# Patient Record
Sex: MALE | Race: BLACK OR AFRICAN AMERICAN | Employment: FULL TIME | URBAN - METROPOLITAN AREA
[De-identification: names, ages, dates, MRNs, and addresses within clinical notes are randomized per-mention and may not be internally consistent; named-entity substitution may affect disease eponyms.]

---

## 2019-02-26 ENCOUNTER — HOSPITAL ENCOUNTER (OUTPATIENT)
Dept: PHYSICAL THERAPY | Age: 44
Setting detail: THERAPIES SERIES
Discharge: HOME | End: 2019-02-26
Payer: COMMERCIAL

## 2019-02-26 PROCEDURE — 97110 THERAPEUTIC EXERCISES: CPT

## 2019-02-26 PROCEDURE — 97162 PT EVAL MOD COMPLEX 30 MIN: CPT

## 2019-02-26 ASSESSMENT — PAIN - FUNCTIONAL ASSESSMENT: PAIN_FUNCTIONAL_ASSESSMENT: PREVENTS OR INTERFERES SOME ACTIVE ACTIVITIES AND ADLS

## 2019-02-26 ASSESSMENT — PAIN DESCRIPTION - LOCATION: LOCATION: LEG

## 2019-02-26 ASSESSMENT — PAIN DESCRIPTION - FREQUENCY: FREQUENCY: INTERMITTENT

## 2019-02-26 ASSESSMENT — PAIN SCALES - GENERAL: PAINLEVEL_OUTOF10: 4

## 2019-02-26 ASSESSMENT — PAIN DESCRIPTION - DESCRIPTORS: DESCRIPTORS: DULL;ACHING

## 2019-02-26 ASSESSMENT — PAIN DESCRIPTION - PAIN TYPE: TYPE: SURGICAL PAIN

## 2019-02-26 ASSESSMENT — PAIN DESCRIPTION - PROGRESSION: CLINICAL_PROGRESSION: GRADUALLY IMPROVING

## 2019-02-26 ASSESSMENT — PAIN DESCRIPTION - ONSET: ONSET: SUDDEN

## 2019-02-26 ASSESSMENT — PAIN DESCRIPTION - ORIENTATION: ORIENTATION: RIGHT

## 2019-03-01 ENCOUNTER — HOSPITAL ENCOUNTER (OUTPATIENT)
Dept: PHYSICAL THERAPY | Age: 44
Setting detail: THERAPIES SERIES
Discharge: HOME | End: 2019-03-01
Payer: COMMERCIAL

## 2019-03-01 PROCEDURE — 97110 THERAPEUTIC EXERCISES: CPT

## 2019-03-04 ENCOUNTER — HOSPITAL ENCOUNTER (OUTPATIENT)
Dept: PHYSICAL THERAPY | Age: 44
Setting detail: THERAPIES SERIES
Discharge: HOME | End: 2019-03-04
Payer: COMMERCIAL

## 2019-03-04 PROCEDURE — 97110 THERAPEUTIC EXERCISES: CPT

## 2019-03-06 ENCOUNTER — HOSPITAL ENCOUNTER (OUTPATIENT)
Dept: PHYSICAL THERAPY | Age: 44
Setting detail: THERAPIES SERIES
Discharge: HOME | End: 2019-03-06
Payer: COMMERCIAL

## 2019-03-06 PROCEDURE — 97110 THERAPEUTIC EXERCISES: CPT

## 2019-03-08 ENCOUNTER — HOSPITAL ENCOUNTER (OUTPATIENT)
Dept: PHYSICAL THERAPY | Age: 44
Setting detail: THERAPIES SERIES
Discharge: HOME | End: 2019-03-08
Payer: COMMERCIAL

## 2019-03-08 PROCEDURE — 97110 THERAPEUTIC EXERCISES: CPT

## 2019-03-11 ENCOUNTER — HOSPITAL ENCOUNTER (OUTPATIENT)
Dept: PHYSICAL THERAPY | Age: 44
Setting detail: THERAPIES SERIES
Discharge: HOME | End: 2019-03-11
Payer: COMMERCIAL

## 2019-03-11 PROCEDURE — 97530 THERAPEUTIC ACTIVITIES: CPT

## 2019-03-11 PROCEDURE — 97140 MANUAL THERAPY 1/> REGIONS: CPT

## 2019-03-11 PROCEDURE — 97110 THERAPEUTIC EXERCISES: CPT

## 2019-03-13 ENCOUNTER — HOSPITAL ENCOUNTER (OUTPATIENT)
Dept: PHYSICAL THERAPY | Age: 44
Setting detail: THERAPIES SERIES
Discharge: HOME | End: 2019-03-13
Payer: COMMERCIAL

## 2019-03-13 PROCEDURE — 97140 MANUAL THERAPY 1/> REGIONS: CPT

## 2019-03-13 PROCEDURE — 97110 THERAPEUTIC EXERCISES: CPT

## 2019-03-15 ENCOUNTER — HOSPITAL ENCOUNTER (OUTPATIENT)
Dept: PHYSICAL THERAPY | Age: 44
Setting detail: THERAPIES SERIES
Discharge: HOME | End: 2019-03-15
Payer: COMMERCIAL

## 2019-03-15 PROCEDURE — 97110 THERAPEUTIC EXERCISES: CPT

## 2019-03-15 PROCEDURE — 97140 MANUAL THERAPY 1/> REGIONS: CPT

## 2019-03-18 ENCOUNTER — HOSPITAL ENCOUNTER (OUTPATIENT)
Dept: PHYSICAL THERAPY | Age: 44
Setting detail: THERAPIES SERIES
Discharge: HOME | End: 2019-03-18
Payer: COMMERCIAL

## 2019-03-18 PROCEDURE — 97116 GAIT TRAINING THERAPY: CPT

## 2019-03-18 PROCEDURE — 97140 MANUAL THERAPY 1/> REGIONS: CPT

## 2019-03-18 PROCEDURE — 97110 THERAPEUTIC EXERCISES: CPT

## 2019-03-20 ENCOUNTER — HOSPITAL ENCOUNTER (OUTPATIENT)
Dept: PHYSICAL THERAPY | Age: 44
Setting detail: THERAPIES SERIES
Discharge: HOME | End: 2019-03-20
Payer: COMMERCIAL

## 2019-03-20 PROCEDURE — 97110 THERAPEUTIC EXERCISES: CPT

## 2019-03-20 PROCEDURE — 97140 MANUAL THERAPY 1/> REGIONS: CPT

## 2019-03-22 ENCOUNTER — HOSPITAL ENCOUNTER (OUTPATIENT)
Dept: PHYSICAL THERAPY | Age: 44
Setting detail: THERAPIES SERIES
Discharge: HOME | End: 2019-03-22
Payer: COMMERCIAL

## 2019-03-22 PROCEDURE — 97110 THERAPEUTIC EXERCISES: CPT

## 2019-03-22 PROCEDURE — 97140 MANUAL THERAPY 1/> REGIONS: CPT

## 2019-03-25 ENCOUNTER — HOSPITAL ENCOUNTER (OUTPATIENT)
Dept: PHYSICAL THERAPY | Age: 44
Setting detail: THERAPIES SERIES
Discharge: HOME | End: 2019-03-25
Payer: COMMERCIAL

## 2019-03-25 PROCEDURE — 97110 THERAPEUTIC EXERCISES: CPT

## 2019-03-25 PROCEDURE — 97530 THERAPEUTIC ACTIVITIES: CPT

## 2019-03-27 ENCOUNTER — HOSPITAL ENCOUNTER (OUTPATIENT)
Dept: PHYSICAL THERAPY | Age: 44
Setting detail: THERAPIES SERIES
Discharge: HOME | End: 2019-03-27
Payer: COMMERCIAL

## 2019-03-27 PROCEDURE — G0283 ELEC STIM OTHER THAN WOUND: HCPCS

## 2019-03-27 PROCEDURE — 97530 THERAPEUTIC ACTIVITIES: CPT

## 2019-03-27 PROCEDURE — 97110 THERAPEUTIC EXERCISES: CPT

## 2019-03-29 ENCOUNTER — HOSPITAL ENCOUNTER (OUTPATIENT)
Dept: PHYSICAL THERAPY | Age: 44
Setting detail: THERAPIES SERIES
Discharge: HOME | End: 2019-03-29
Payer: COMMERCIAL

## 2019-03-29 PROCEDURE — 97110 THERAPEUTIC EXERCISES: CPT

## 2019-03-29 PROCEDURE — 97140 MANUAL THERAPY 1/> REGIONS: CPT

## 2019-03-29 PROCEDURE — G0283 ELEC STIM OTHER THAN WOUND: HCPCS

## 2019-04-03 ENCOUNTER — HOSPITAL ENCOUNTER (OUTPATIENT)
Dept: PHYSICAL THERAPY | Age: 44
Setting detail: THERAPIES SERIES
Discharge: HOME | End: 2019-04-03
Payer: COMMERCIAL

## 2019-04-03 PROCEDURE — 97110 THERAPEUTIC EXERCISES: CPT

## 2019-04-03 PROCEDURE — G0283 ELEC STIM OTHER THAN WOUND: HCPCS

## 2019-04-03 NOTE — FLOWSHEET NOTE
flexion. 3/13/19: No pain. I walked a lot lately. 3/15/19: Pt reports no pain right now . Took meds about one hour ago. Sleeping in bed now, not in recliner. 3/18/19: Pt reports no pain and states that he is tired from overdoing it this weekend. 03/20/19: Patient reports he has been using his cane more. 03/22/19: Patient reports he has minimal pain,just stiffness. 3/25/19: States he is really sore in his R thigh, may be due to the rain. 3/27/19: States he saw the MD and he is to come 4 more weeks. Pt is doing prettyg good  3/29/19: Pt reports that he is tight in the R thigh, but has no pain. 04/03/19: Patient reports leg was more swollen after he did a lot of walking over the weekend. Objective: See eval  Observation:   Test measurements:  AROM R knee flex 84  03/06/19 knee flex with OP 90  3/11/19: right knee ROM : 4-95 with OP  3/29/19: right knee ROM =96/97      Exercises:  Exercise/Equipment Resistance/Repetitions Other comments   EZ Stretch 3 min, 3 min, 3 min    TKE 30 X 3#    Heel slides 10\"x 10     20 X    Nu step  L6 x 6 min To work on knee flexion   Standing marching 30 x   alternating Added 3/01   Standing abd 20 x alternating Added 3/01   Minisquats 20 x Added 3/01   Step flex strecth 3 x 30 sec  Added 3/04    HR  30 x  Added 3/04   Seated curl     Leg press 95# x 20x 2 Added 3/8/19   Sidestepping 2 min 3/11/19, 3/18/19 added orange band   Incline board 30 sec x 3 3/13   SLR  3/18/19   Step balance 10 sec x 5 L/R      Other Therapeutic Activities:  Patient was educated on diagnosis, plan of care and prognosis of their complaint. Also, frequency and duration of treatments was discussed. Patient was informed of the attendance policy and issued a copy for their records. 2/26/19: gait with standard walker with CGA. Transfers are Independent. 3/8/18: Practiced with SPC within II bars and 3 point gait. 3/11/19: Practiced gait with cane in bars. Also practiced on steps and one step.  3/13/19: Pt ambulation.   Electronically signed by:  Elio Samuel PTA,

## 2019-04-05 ENCOUNTER — HOSPITAL ENCOUNTER (OUTPATIENT)
Dept: PHYSICAL THERAPY | Age: 44
Setting detail: THERAPIES SERIES
Discharge: HOME | End: 2019-04-05
Payer: COMMERCIAL

## 2019-04-05 PROCEDURE — 97110 THERAPEUTIC EXERCISES: CPT

## 2019-04-05 PROCEDURE — G0283 ELEC STIM OTHER THAN WOUND: HCPCS

## 2019-04-05 NOTE — FLOWSHEET NOTE
Physical Therapy Daily Treatment Note  Date:  2019    Patient Name:  Nona Zapata    :  1975  MRN: [id number]    Restrictions/Precautions:      Pertinent Medical History:      Medical/Treatment Diagnosis Information:  · Diagnosis: Right femoral fracture  · Treatment Diagnosis: Difficulty with walking, pain on right femur, Limited ROM on R LE, artis knee, weakness of right LE post op    Insurance/Certification information:  PT Insurance Information: [employer]  Physician Information:  Referring Practitioner: Sarabjit Nunez MD  Plan of care signed (Y/N):  Sent to Dr. Tabitha Espitia on 19    Visit# / total visits:   + (new script)  Pain level:  0/10     G-Code (if applicable):      Date / Visit # G-Code Applied:  19/  PT G-Codes  Functional Assessment Tool Used: LEFSTOOL  Score: 25, CL    Progress Note: [x]  Yes  [x]  No  Next due by: Visit #10      History of Injury: See below     Subjective:  Subjective  Subjective: Pt slipped and fell down flight of steps on 19 at the garage where he is a  in [facility]. Don Alfaro He was taken by ambulance to [facility] and had surgery that night for a femoral fracture. He began therapy the next day . He was on in-patient rehab unit from 19-19. He was brought by personal vehicle to [place] where his famlily lives. He has seen a primary care MD here and  will begin PT here. He will see a local ortho doctor here tomorrow. Don Alfaro He is bothered by edema more than pain . He is walking with standard walker. 19: Patient reports hip and knee feel stiff. States he took pain pill before therapy. 19: Patient reports swelling is down some. States his anterior tigth area is tender. 19: Patient reports increased soreness in right adductor area. 3/8/19: Pt took Naproxyn and this helped the right adductor area. His ortho said he is healing well and to start weaning off walker to cane. 3/11/19: Pt reports no pain.  He is working on more knee with cane in bars. Also practiced on steps and one step. 3/13/19: Pt ambulated through parallel bars several times with good gait sequence without hopping. 3/18/19: Pt was able to ambulate inside II bars with step thru, with cane and and then outside the bars for 40 feet with only cane and good gait sequence. Also ambulated 100 feet with SPC and min assist of one. Home Exercise Program:  Patient was already familiar with the exercises instructed today, from PT at [facility]. 3/13/19: Gave step lunge written instructions. Manual Treatments:  STM to right quad Passive Knee flexion and Passive knee ext X 5 min    Modalities:    CP x 15 min to right thigh with ESTIM IFC with bolster supporting right LE. Timed Code Treatment Minutes:    60  Total Treatment Minutes:    75    Treatment/Activity Tolerance:  [x] Patient tolerated treatment well [] Patient limited by fatigue  [] Patient limited by pain  [] Patient limited by other medical complications  [x] Other: Pt met goal of ambulating with SPC today. Be aggressive with knee flexion.   Prognosis: [x] Good [] Fair  [] Poor    Goals:    Short term goals  Time Frame for Short term goals: 6 weeks  Short term goal 1: Pt will increase right LE strength to 4/5  Short term goal 2: Pt will increase ROM of right hip and knee by at least 10 degrees  Short term goal 3: Pt will progress ambulation to SPC/MET  Short term goal 4: Pt will show less edema around right distal LE /MET     Long term goals  Time Frame for Long term goals : Upon DC  Long term goal 1: Pt will increase right LE strength to 4+/5 or more  Long term goal 2: Pt will increase ROM of right knee by at least 40-50 degrees  Long term goal 3: Pt will progress ambulation to no AD       Patient Requires Follow-up: [x] Yes  [] No    Plan:   [x] Continue per plan of care [] Alter current plan (see comments)  [x] Plan of care initiated [] Hold pending MD visit [] Discharge    Plan for Next Session: Progress exercises as indicated. Push knee flexion aggressively. Practice with cane for ambulation.   Electronically signed by:  Munir Cervantes PTA

## 2019-04-08 ENCOUNTER — HOSPITAL ENCOUNTER (OUTPATIENT)
Dept: PHYSICAL THERAPY | Age: 44
Setting detail: THERAPIES SERIES
Discharge: HOME | End: 2019-04-08
Payer: COMMERCIAL

## 2019-04-08 PROCEDURE — 97110 THERAPEUTIC EXERCISES: CPT

## 2019-04-08 PROCEDURE — 97140 MANUAL THERAPY 1/> REGIONS: CPT

## 2019-04-08 NOTE — FLOWSHEET NOTE
Physical Therapy Daily Treatment Note  Date:  2019    Patient Name:  Siri Mart    :  1975  MRN: [id number]    Restrictions/Precautions:      Pertinent Medical History:      Medical/Treatment Diagnosis Information:  · Diagnosis: Right femoral fracture  · Treatment Diagnosis: Difficulty with walking, pain on right femur, Limited ROM on R LE, artis knee, weakness of right LE post op    Insurance/Certification information:  PT Insurance Information: [employer]a  Physician Information:  Referring Practitioner: Connor Larry MD  Plan of care signed (Y/N):  Sent to Dr. Noa Bush on 19    Visit# / total visits:   + (new script)  Pain level:  0/10     G-Code (if applicable):      Date / Visit # G-Code Applied:  19/  PT G-Codes  Functional Assessment Tool Used: LEFSTOOL  Score: 25, CL    Progress Note: [x]  Yes  [x]  No  Next due by: Visit #10      History of Injury: See below     Subjective:  Subjective  Subjective: Pt slipped and fell down flight of steps on 19 at the garage where he is a  in [facility]. Leslie Vivar He was taken by ambulance to [facility] and had surgery that night for a femoral fracture. He began therapy the next day . He was on in-patient rehab unit from 19-19. He was brought by personal vehicle to [place] where his famlily lives. He has seen a primary care MD here and  will begin PT here. He will see a local ortho doctor here tomorrow. Leslie Vivar He is bothered by edema more than pain . He is walking with standard walker. 19: Patient reports hip and knee feel stiff. States he took pain pill before therapy. 19: Patient reports swelling is down some. States his anterior tigth area is tender. 19: Patient reports increased soreness in right adductor area. 3/8/19: Pt took Naproxyn and this helped the right adductor area. His ortho said he is healing well and to start weaning off walker to cane. 3/11/19: Pt reports no pain.  He is working on more knee flexion. 3/13/19: No pain. I walked a lot lately. 3/15/19: Pt reports no pain right now . Took meds about one hour ago. Sleeping in bed now, not in recliner. 3/18/19: Pt reports no pain and states that he is tired from overdoing it this weekend. 03/20/19: Patient reports he has been using his cane more. 03/22/19: Patient reports he has minimal pain,just stiffness. 3/25/19: States he is really sore in his R thigh, may be due to the rain. 3/27/19: States he saw the MD and he is to come 4 more weeks. Pt is doing prettyg good  3/29/19: Pt reports that he is tight in the R thigh, but has no pain. 04/03/19: Patient reports leg was more swollen after he did a lot of walking over the weekend. 04/05/19: Patient reports leg has been feeling better. States he drove   4/8/19: Pt reports that he is doing better, but has some soreness on right thigh. Objective: See eval  Observation:   Test measurements:  AROM R knee flex 84  03/06/19 knee flex with OP 90  3/11/19: right knee ROM : 4-95 with OP  3/29/19: right knee ROM =96/97  4/8/19: R knee flexion to 102 with OP      Exercises:  Exercise/Equipment Resistance/Repetitions Other comments   EZ Stretch 3 min, 3 min, 3 min    TKE 30 X 3#    Heel slides 10\"x 10     20 X    Nu step  L6 x 6 min To work on knee flexion   Standing marching 30 x   alternating Added 3/01   Standing abd 20 x alternating Added 3/01   Minisquats 20 x without hands Added 3/01   Step flex strecth 3 x 30 sec  Added 3/04    HR  30 x  Added 3/04   Seated curl     Leg press 105# x 20x 2 Added 3/8/19   Sidestepping 2 min 3/11/19, 3/18/19 added orange band   Incline board 30 sec x 3 3/13   SLR  3/18/19   Step balance 10 sec x 5 L/R      Other Therapeutic Activities:  Patient was educated on diagnosis, plan of care and prognosis of their complaint. Also, frequency and duration of treatments was discussed. Patient was informed of the attendance policy and issued a copy for their records.    2/26/19: gait with standard walker with CGA. Transfers are Independent. 3/8/18: Practiced with SPC within II bars and 3 point gait. 3/11/19: Practiced gait with cane in bars. Also practiced on steps and one step. 3/13/19: Pt ambulated through parallel bars several times with good gait sequence without hopping. 3/18/19: Pt was able to ambulate inside II bars with step thru, with cane and and then outside the bars for 40 feet with only cane and good gait sequence. Also ambulated 100 feet with SPC and min assist of one. Home Exercise Program:  Patient was already familiar with the exercises instructed today, from PT at [facility]. 3/13/19: Gave step lunge written instructions. Manual Treatments:  STM to right quad Passive Knee flexion and Passive knee ext X 10 min    Modalities:    CP x 15 min to right thigh with EZ stretch  Timed Code Treatment Minutes:    60  Total Treatment Minutes:    75    Treatment/Activity Tolerance:  [x] Patient tolerated treatment well [] Patient limited by fatigue  [] Patient limited by pain  [] Patient limited by other medical complications  [x] Other: Pt met goal of ambulating with SPC today. Be aggressive with knee flexion.   Prognosis: [x] Good [] Fair  [] Poor    Goals:    Short term goals  Time Frame for Short term goals: 6 weeks  Short term goal 1: Pt will increase right LE strength to 4/5  Short term goal 2: Pt will increase ROM of right hip and knee by at least 10 degrees  Short term goal 3: Pt will progress ambulation to SPC/MET  Short term goal 4: Pt will show less edema around right distal LE /MET     Long term goals  Time Frame for Long term goals : Upon DC  Long term goal 1: Pt will increase right LE strength to 4+/5 or more  Long term goal 2: Pt will increase ROM of right knee by at least 40-50 degrees  Long term goal 3: Pt will progress ambulation to no AD       Patient Requires Follow-up: [x] Yes  [] No    Plan:   [x] Continue per plan of care [] Alter current plan (see comments)  [x] Plan of

## 2019-04-10 ENCOUNTER — HOSPITAL ENCOUNTER (OUTPATIENT)
Dept: PHYSICAL THERAPY | Age: 44
Setting detail: THERAPIES SERIES
Discharge: HOME | End: 2019-04-10
Payer: COMMERCIAL

## 2019-04-10 PROCEDURE — 97110 THERAPEUTIC EXERCISES: CPT

## 2019-04-10 PROCEDURE — G0283 ELEC STIM OTHER THAN WOUND: HCPCS

## 2019-04-10 NOTE — FLOWSHEET NOTE
Physical Therapy Daily Treatment Note  Date:  4/10/2019    Patient Name:  Sydney English    :  1975  MRN: [id number]    Restrictions/Precautions:      Pertinent Medical History:      Medical/Treatment Diagnosis Information:  · Diagnosis: Right femoral fracture  · Treatment Diagnosis: Difficulty with walking, pain on right femur, Limited ROM on R LE, artis knee, weakness of right LE post op    Insurance/Certification information:  PT Insurance Information: Cigna  Physician Information:  Referring Practitioner: Kiesha Walker MD  Plan of care signed (Y/N):  Sent to Dr. Mary Mena on 19    Visit# / total visits:   + (new script)  Pain level:  0/10     G-Code (if applicable):      Date / Visit # G-Code Applied:  19/  PT G-Codes  Functional Assessment Tool Used: LEFSTOOL  Score: 25, CL    Progress Note: [x]  Yes  [x]  No  Next due by: Visit #10      History of Injury: See below     Subjective:  Subjective  Subjective: Pt slipped and fell down flight of steps on 19 at the garage where he is a  in [facility]. Stacey Never He was taken by ambulance to [facility] and had surgery that night for a femoral fracture. He began therapy the next day . He was on in-patient rehab unit from 19-19. He was brought by personal vehicle to [place] where his famlily lives. He has seen a primary care MD here and  will begin PT here. He will see a local ortho doctor here tomorrow. Stacey Never He is bothered by edema more than pain . He is walking with standard walker. 19: Patient reports hip and knee feel stiff. States he took pain pill before therapy. 19: Patient reports swelling is down some. States his anterior tigth area is tender. 19: Patient reports increased soreness in right adductor area. 3/8/19: Pt took Naproxyn and this helped the right adductor area. His ortho said he is healing well and to start weaning off walker to cane. 3/11/19: Pt reports no pain.  He is working on more knee flexion. 3/13/19: No pain. I walked a lot lately. 3/15/19: Pt reports no pain right now . Took meds about one hour ago. Sleeping in bed now, not in recliner. 3/18/19: Pt reports no pain and states that he is tired from overdoing it this weekend. 03/20/19: Patient reports he has been using his cane more. 03/22/19: Patient reports he has minimal pain,just stiffness. 3/25/19: States he is really sore in his R thigh, may be due to the rain. 3/27/19: States he saw the MD and he is to come 4 more weeks. Pt is doing prettyg good  3/29/19: Pt reports that he is tight in the R thigh, but has no pain. 04/03/19: Patient reports leg was more swollen after he did a lot of walking over the weekend. 04/05/19: Patient reports leg has been feeling better. States he drove   4/8/19: Pt reports that he is doing better, but has some soreness on right thigh. 04/101/9: Patient reports soreness anterior thigh area. Objective: See eval  Observation:   Test measurements:  AROM R knee flex 84  03/06/19 knee flex with OP 90  3/11/19: right knee ROM : 4-95 with OP  3/29/19: right knee ROM =96/97  4/8/19: R knee flexion to 102 with OP      Exercises:  Exercise/Equipment Resistance/Repetitions Other comments   EZ Stretch 3 min, 3 min, 3 min    TKE 30 X 3#    Heel slides 10\"x 10     20 X    Nu step  L6 x 6 min To work on knee flexion   Standing marching 30 x   alternating Added 3/01   Standing abd 20 x alternating Added 3/01   Minisquats 20 x without hands Added 3/01   Step flex strecth 3 x 30 sec  Added 3/04    HR  30 x  Added 3/04   Seated curl     Leg press 105# x 20x 2 Added 3/8/19   Sidestepping 2 min 3/11/19, 3/18/19 added orange band   Incline board 30 sec x 3 3/13   SLR  3/18/19   Step balance 10 sec x 5 L/R      Other Therapeutic Activities:  Patient was educated on diagnosis, plan of care and prognosis of their complaint. Also, frequency and duration of treatments was discussed.  Patient was informed of the attendance policy and issued a copy for their records. 2/26/19: gait with standard walker with CGA. Transfers are Independent. 3/8/18: Practiced with SPC within II bars and 3 point gait. 3/11/19: Practiced gait with cane in bars. Also practiced on steps and one step. 3/13/19: Pt ambulated through parallel bars several times with good gait sequence without hopping. 3/18/19: Pt was able to ambulate inside II bars with step thru, with cane and and then outside the bars for 40 feet with only cane and good gait sequence. Also ambulated 100 feet with SPC and min assist of one. Home Exercise Program:  Patient was already familiar with the exercises instructed today, from PT at [facility]. 3/13/19: Gave step lunge written instructions. Manual Treatments:  STM to right quad Passive Knee flexion and Passive knee ext X 5 min    Modalities:    IFC withCP x 15 min to right thigh   Timed Code Treatment Minutes:    60  Total Treatment Minutes:    75    Treatment/Activity Tolerance:  [x] Patient tolerated treatment well [] Patient limited by fatigue  [] Patient limited by pain  [] Patient limited by other medical complications  [x] Other: Pt met goal of ambulating with SPC today. Be aggressive with knee flexion.   Prognosis: [x] Good [] Fair  [] Poor    Goals:    Short term goals  Time Frame for Short term goals: 6 weeks  Short term goal 1: Pt will increase right LE strength to 4/5  Short term goal 2: Pt will increase ROM of right hip and knee by at least 10 degrees  Short term goal 3: Pt will progress ambulation to SPC/MET  Short term goal 4: Pt will show less edema around right distal LE /MET     Long term goals  Time Frame for Long term goals : Upon DC  Long term goal 1: Pt will increase right LE strength to 4+/5 or more  Long term goal 2: Pt will increase ROM of right knee by at least 40-50 degrees  Long term goal 3: Pt will progress ambulation to no AD       Patient Requires Follow-up: [x] Yes  [] No    Plan:   [x] Continue per plan of care [] Alter current plan (see comments)  [x] Plan of care initiated [] Hold pending MD visit [] Discharge    Plan for Next Session: Progress exercises as indicated. Push knee flexion aggressively. Practice with cane for ambulation.   Electronically signed by:  Jam Walter PTA,

## 2019-04-15 ENCOUNTER — HOSPITAL ENCOUNTER (OUTPATIENT)
Dept: PHYSICAL THERAPY | Age: 44
Setting detail: THERAPIES SERIES
Discharge: HOME | End: 2019-04-15
Payer: COMMERCIAL

## 2019-04-15 NOTE — FLOWSHEET NOTE
Physical Therapy Daily Treatment Note  Date:  4/15/2019    Patient Name:  Jose Luis Montes De Oca    :  1975  MRN: [id number]    Restrictions/Precautions:      Pertinent Medical History:      Medical/Treatment Diagnosis Information:  · Diagnosis: Right femoral fracture  · Treatment Diagnosis: Difficulty with walking, pain on right femur, Limited ROM on R LE, artis knee, weakness of right LE post op    Insurance/Certification information:  PT Insurance Information: [employer]  Physician Information:  Referring Practitioner: Delfino Montalvo MD  Plan of care signed (Y/N):  Sent to Dr. Sivakumar Frederick on 19    Visit# / total visits:   +7/10 (new script - one from Dr. Fredis Valdivia and one from Dr. Sivakumar Frederick)  Pain level:  0/10     G-Code (if applicable):      Date / Visit # G-Code Applied:  19/  PT G-Codes  Functional Assessment Tool Used: LEFSTOOL  Score: 25, CL    Progress Note: [x]  Yes  [x]  No  Next due by: Visit #10      History of Injury: See below     Subjective:  Subjective  Subjective: Pt slipped and fell down flight of steps on 19 at the garage where he is a  in [facility]. Dewaagustin Eng He was taken by ambulance to [facility] and had surgery that night for a femoral fracture. He began therapy the next day . He was on in-patient rehab unit from 19-19. He was brought by personal vehicle to [place] where his famlily lives. He has seen a primary care MD here and  will begin PT here. He will see a local ortho doctor here tomorrow. Sanna Eng He is bothered by edema more than pain . He is walking with standard walker. 19: Patient reports hip and knee feel stiff. States he took pain pill before therapy. 19: Patient reports swelling is down some. States his anterior tigth area is tender. 19: Patient reports increased soreness in right adductor area. 3/8/19: Pt took Naproxyn and this helped the right adductor area. His ortho said he is healing well and to start weaning off walker to cane.   3/11/19: Pt reports no pain. He is working on more knee flexion. 3/13/19: No pain. I walked a lot lately. 3/15/19: Pt reports no pain right now . Took meds about one hour ago. Sleeping in bed now, not in recliner. 3/18/19: Pt reports no pain and states that he is tired from overdoing it this weekend. 03/20/19: Patient reports he has been using his cane more. 03/22/19: Patient reports he has minimal pain,just stiffness. 3/25/19: States he is really sore in his R thigh, may be due to the rain. 3/27/19: States he saw the MD and he is to come 4 more weeks. Pt is doing prettyg good  3/29/19: Pt reports that he is tight in the R thigh, but has no pain. 04/03/19: Patient reports leg was more swollen after he did a lot of walking over the weekend. 04/05/19: Patient reports leg has been feeling better. States he drove   4/8/19: Pt reports that he is doing better, but has some soreness on right thigh. 04/101/9: Patient reports soreness anterior thigh area. 4/15/19: Pt reports some soreness in anterior thigh.      Objective: See eval  Observation:   Test measurements:  AROM R knee flex 84  03/06/19 knee flex with OP 90  3/11/19: right knee ROM : 4-95 with OP  3/29/19: right knee ROM =96/97  4/8/19: R knee flexion to 102 with OP  4/15/19: 0-108 with OP      Exercises:  Exercise/Equipment Resistance/Repetitions Other comments   EZ Stretch with CP 5min, 5 min, 5min    TKE 30 X 4#    Heel slides 10\"x 10     20 X    Nu step  L6 x 6 min To work on knee flexion   Standing marching 30 x   alternating Added 3/01   Standing abd 20 x alternating Added 3/01   Minisquats 20 x without hands Added 3/01   Step flex strecth 3 x 30 sec  Added 3/04    HR  30 x  Added 3/04   Seated curl     Leg press 105# x 20x 2 Added 3/8/19   Sidestepping 2 min 3/11/19, 3/18/19 added orange band   Incline board 30 sec x 3 3/13   SLR 2 x 10 3# 3/18/19   Step balance 10 sec x 5 L/R      Other Therapeutic Activities:  Patient was educated on diagnosis, plan of care and prognosis of their complaint. Also, frequency and duration of treatments was discussed. Patient was informed of the attendance policy and issued a copy for their records. 2/26/19: gait with standard walker with CGA. Transfers are Independent. 3/8/18: Practiced with SPC within II bars and 3 point gait. 3/11/19: Practiced gait with cane in bars. Also practiced on steps and one step. 3/13/19: Pt ambulated through parallel bars several times with good gait sequence without hopping. 3/18/19: Pt was able to ambulate inside II bars with step thru, with cane and and then outside the bars for 40 feet with only cane and good gait sequence. Also ambulated 100 feet with SPC and min assist of one. Home Exercise Program:  Patient was already familiar with the exercises instructed today, from PT at [facility]. 3/13/19: Gave step lunge written instructions. Manual Treatments:  STM to right quad Passive Knee flexion and Passive knee ext X 14 min    Modalities:       Timed Code Treatment Minutes:    60  Total Treatment Minutes:    75    Treatment/Activity Tolerance:  [x] Patient tolerated treatment well [] Patient limited by fatigue  [] Patient limited by pain  [] Patient limited by other medical complications  [x] Other: Pt met goal of ambulating with SPC today. Be aggressive with knee flexion.   Prognosis: [x] Good [] Fair  [] Poor    Goals:    Short term goals  Time Frame for Short term goals: 6 weeks  Short term goal 1: Pt will increase right LE strength to 4/5  Short term goal 2: Pt will increase ROM of right hip and knee by at least 10 degrees/ MET  Short term goal 3: Pt will progress ambulation to SPC/MET  Short term goal 4: Pt will show less edema around right distal LE /MET     Long term goals  Time Frame for Long term goals : Upon DC  Long term goal 1: Pt will increase right LE strength to 4+/5 or more  Long term goal 2: Pt will increase ROM of right knee by at least 40-50 degrees  Long term goal 3: Pt will progress

## 2019-04-17 ENCOUNTER — HOSPITAL ENCOUNTER (OUTPATIENT)
Dept: PHYSICAL THERAPY | Age: 44
Setting detail: THERAPIES SERIES
Discharge: HOME | End: 2019-04-17
Payer: COMMERCIAL

## 2019-04-17 PROCEDURE — 97110 THERAPEUTIC EXERCISES: CPT

## 2019-04-17 PROCEDURE — 97140 MANUAL THERAPY 1/> REGIONS: CPT

## 2019-04-17 NOTE — FLOWSHEET NOTE
Physical Therapy Daily Treatment Note  Date:  2019    Patient Name:  Kyra Alanis    :  1975  MRN: [id number]    Restrictions/Precautions:      Pertinent Medical History:      Medical/Treatment Diagnosis Information:  · Diagnosis: Right femoral fracture  · Treatment Diagnosis: Difficulty with walking, pain on right femur, Limited ROM on R LE, artis knee, weakness of right LE post op    Insurance/Certification information:  PT Insurance Information: [employer]  Physician Information:  Referring Practitioner: Clarke Bishop MD  Plan of care signed (Y/N):  Sent to Dr. Aide Campos on 19    Visit# / total visits:   +8/10 (new script - one from Dr. Chris Padron and one from Dr. Aide Campos)  Pain level:  0/10     G-Code (if applicable):      Date / Visit # G-Code Applied:  19/  PT G-Codes  Functional Assessment Tool Used: LEFSTOOL  Score: 25, CL    Progress Note: [x]  Yes  [x]  No  Next due by: Visit #10      History of Injury: See below     Subjective:  Subjective  Subjective: Pt slipped and fell down flight of steps on 19 at the garage where he is a  in [facility]. Penny Krishna He was taken by ambulance to [facility] and had surgery that night for a femoral fracture. He began therapy the next day . He was on in-patient rehab unit from 19-19. He was brought by personal vehicle to [place] where his famlily lives. He has seen a primary care MD here and  will begin PT here. He will see a local ortho doctor here tomorrow. Penny Krishna He is bothered by edema more than pain . He is walking with standard walker. 19: Patient reports hip and knee feel stiff. States he took pain pill before therapy. 19: Patient reports swelling is down some. States his anterior tigth area is tender. 19: Patient reports increased soreness in right adductor area. 3/8/19: Pt took Naproxyn and this helped the right adductor area. His ortho said he is healing well and to start weaning off walker to cane.   3/11/19: Pt reports no pain. He is working on more knee flexion. 3/13/19: No pain. I walked a lot lately. 3/15/19: Pt reports no pain right now . Took meds about one hour ago. Sleeping in bed now, not in recliner. 3/18/19: Pt reports no pain and states that he is tired from overdoing it this weekend. 03/20/19: Patient reports he has been using his cane more. 03/22/19: Patient reports he has minimal pain,just stiffness. 3/25/19: States he is really sore in his R thigh, may be due to the rain. 3/27/19: States he saw the MD and he is to come 4 more weeks. Pt is doing prettyg good  3/29/19: Pt reports that he is tight in the R thigh, but has no pain. 04/03/19: Patient reports leg was more swollen after he did a lot of walking over the weekend. 04/05/19: Patient reports leg has been feeling better. States he drove   4/8/19: Pt reports that he is doing better, but has some soreness on right thigh. 04/101/9: Patient reports soreness anterior thigh area. 4/15/19: Pt reports some soreness in anterior thigh. 4/17/19: Pt reports that he is improving and only has muscle soreness on right medial thigh.     Objective: See eval  Observation:   Test measurements:  AROM R knee flex 84  03/06/19 knee flex with OP 90  3/11/19: right knee ROM : 4-95 with OP  3/29/19: right knee ROM =96/97  4/8/19: R knee flexion to 102 with OP  4/15/19: 0-108 with OP      Exercises:  Exercise/Equipment Resistance/Repetitions Other comments   with CP 10 min C since E Z stretcwas in use    TKE 30 X 4#    Heel slides 10\"x 10    Ball rolls 20 X    Nu step  L6 x 6 min To work on knee flexion   Standing marching 30 x   alternating Added 3/01   Standing abd 20 x alternating Added 3/01   Minisquats 20 x without hands Added 3/01   Step flex strecth 3 x 30 sec  Added 3/04    HR  30 x  Added 3/04   Seated curl     Leg press 105# x 20x 2 Added 3/8/19   Sidestepping 2 min 3/11/19, 3/18/19 added orange band   Incline board 30 sec x 3 3/13   SLR 2 x 10 3# 3/18/19 Step balance 10 sec x 5 L/R      Other Therapeutic Activities:  Patient was educated on diagnosis, plan of care and prognosis of their complaint. Also, frequency and duration of treatments was discussed. Patient was informed of the attendance policy and issued a copy for their records. 2/26/19: gait with standard walker with CGA. Transfers are Independent. 3/8/18: Practiced with SPC within II bars and 3 point gait. 3/11/19: Practiced gait with cane in bars. Also practiced on steps and one step. 3/13/19: Pt ambulated through parallel bars several times with good gait sequence without hopping. 3/18/19: Pt was able to ambulate inside II bars with step thru, with cane and and then outside the bars for 40 feet with only cane and good gait sequence. Also ambulated 100 feet with SPC and min assist of one. Home Exercise Program:  Patient was already familiar with the exercises instructed today, from PT at [facility]. 3/13/19: Gave step lunge written instructions. Manual Treatments:  STM to right quad Passive Knee flexion and Passive knee ext X 10 min    Modalities:       Timed Code Treatment Minutes:    60  Total Treatment Minutes:    75    Treatment/Activity Tolerance:  [x] Patient tolerated treatment well [] Patient limited by fatigue  [] Patient limited by pain  [] Patient limited by other medical complications  [x] Other: Pt met goal of ambulating with SPC today. Be aggressive with knee flexion.   Prognosis: [x] Good [] Fair  [] Poor    Goals:    Short term goals  Time Frame for Short term goals: 6 weeks  Short term goal 1: Pt will increase right LE strength to 4/5  Short term goal 2: Pt will increase ROM of right hip and knee by at least 10 degrees/ MET  Short term goal 3: Pt will progress ambulation to SPC/MET  Short term goal 4: Pt will show less edema around right distal LE /MET     Long term goals  Time Frame for Long term goals : Upon DC  Long term goal 1: Pt will increase right LE strength to 4+/5 or more  Long term goal 2: Pt will increase ROM of right knee by at least 40-50 degrees  Long term goal 3: Pt will progress ambulation to no AD       Patient Requires Follow-up: [x] Yes  [] No    Plan:   [x] Continue per plan of care [] Alter current plan (see comments)  [] Plan of care initiated [] Hold pending MD visit [] Discharge    Plan for Next Session: Progress exercises as indicated. Push knee flexion aggressively. Try ambulation without asst device. .  Electronically signed by:  Jimbo Cline, PT,

## 2019-04-22 ENCOUNTER — HOSPITAL ENCOUNTER (OUTPATIENT)
Dept: PHYSICAL THERAPY | Age: 44
Setting detail: THERAPIES SERIES
Discharge: HOME | End: 2019-04-22
Payer: COMMERCIAL

## 2019-04-22 PROCEDURE — 97110 THERAPEUTIC EXERCISES: CPT

## 2019-04-22 PROCEDURE — 97140 MANUAL THERAPY 1/> REGIONS: CPT

## 2019-04-22 NOTE — FLOWSHEET NOTE
reports no pain. He is working on more knee flexion. 3/13/19: No pain. I walked a lot lately. 3/15/19: Pt reports no pain right now . Took meds about one hour ago. Sleeping in bed now, not in recliner. 3/18/19: Pt reports no pain and states that he is tired from overdoing it this weekend. 03/20/19: Patient reports he has been using his cane more. 03/22/19: Patient reports he has minimal pain,just stiffness. 3/25/19: States he is really sore in his R thigh, may be due to the rain. 3/27/19: States he saw the MD and he is to come 4 more weeks. Pt is doing prettyg good  3/29/19: Pt reports that he is tight in the R thigh, but has no pain. 04/03/19: Patient reports leg was more swollen after he did a lot of walking over the weekend. 04/05/19: Patient reports leg has been feeling better. States he drove   4/8/19: Pt reports that he is doing better, but has some soreness on right thigh. 04/101/9: Patient reports soreness anterior thigh area. 4/15/19: Pt reports some soreness in anterior thigh. 4/17/19: Pt reports that he is improving and only has muscle soreness on right medial thigh. 04/22/19: Patient reports he has been walking more without the cane at home. States soreness and swelling is about the same.     Objective: See eval  Observation:   Test measurements:  AROM R knee flex 84  03/06/19 knee flex with OP 90  3/11/19: right knee ROM : 4-95 with OP  3/29/19: right knee ROM =96/97  4/8/19: R knee flexion to 102 with OP  4/15/19: 0-108 with OP      Exercises:  Exercise/Equipment Resistance/Repetitions Other comments   with CP 10 min C since E Z stretcwas in use    TKE 30 X 4#    Heel slides 10\"x 10    Ball rolls 20 X    Nu step  L6 x 6 min To work on knee flexion   Standing marching 30 x   alternating Added 3/01   Standing abd 20 x alternating Added 3/01   Minisquats 20 x without hands Added 3/01   Step flex strecth 3 x 30 sec  Added 3/04    HR  30 x  Added 3/04   Seated curl     Leg press 110# x 20x 2 Added 3/8/19   Sidestepping 2 min 3/11/19, 3/18/19 added orange band   Incline board 30 sec x 3 3/13   SLR 2 x 10 3# 3/18/19   Step balance 10 sec x 5 L/R      Other Therapeutic Activities:  Patient was educated on diagnosis, plan of care and prognosis of their complaint. Also, frequency and duration of treatments was discussed. Patient was informed of the attendance policy and issued a copy for their records. 2/26/19: gait with standard walker with CGA. Transfers are Independent. 3/8/18: Practiced with SPC within II bars and 3 point gait. 3/11/19: Practiced gait with cane in bars. Also practiced on steps and one step. 3/13/19: Pt ambulated through parallel bars several times with good gait sequence without hopping. 3/18/19: Pt was able to ambulate inside II bars with step thru, with cane and and then outside the bars for 40 feet with only cane and good gait sequence. Also ambulated 100 feet with SPC and min assist of one. Home Exercise Program:  Patient was already familiar with the exercises instructed today, from PT at [facility]. 3/13/19: Gave step lunge written instructions. Manual Treatments:  STM to right quad Passive Knee flexion and Passive knee ext X 10 min    Modalities:       Timed Code Treatment Minutes:    60  Total Treatment Minutes:    75    Treatment/Activity Tolerance:  [x] Patient tolerated treatment well [] Patient limited by fatigue  [] Patient limited by pain  [] Patient limited by other medical complications  [x] Other: Pt met goal of ambulating with SPC today. Be aggressive with knee flexion.   Prognosis: [x] Good [] Fair  [] Poor    Goals:    Short term goals  Time Frame for Short term goals: 6 weeks  Short term goal 1: Pt will increase right LE strength to 4/5  Short term goal 2: Pt will increase ROM of right hip and knee by at least 10 degrees/ MET  Short term goal 3: Pt will progress ambulation to SPC/MET  Short term goal 4: Pt will show less edema around right distal LE /MET     Long term goals  Time Frame for Long term goals : Upon DC  Long term goal 1: Pt will increase right LE strength to 4+/5 or more  Long term goal 2: Pt will increase ROM of right knee by at least 40-50 degrees  Long term goal 3: Pt will progress ambulation to no AD       Patient Requires Follow-up: [x] Yes  [] No    Plan:   [x] Continue per plan of care [] Alter current plan (see comments)  [] Plan of care initiated [] Hold pending MD visit [] Discharge    Plan for Next Session: Progress exercises as indicated. Push knee flexion aggressively. Try ambulation without asst device. .  Electronically signed by:  Aleyda Zambrano PTA,

## 2019-04-24 ENCOUNTER — HOSPITAL ENCOUNTER (OUTPATIENT)
Dept: PHYSICAL THERAPY | Age: 44
Setting detail: THERAPIES SERIES
Discharge: HOME | End: 2019-04-24
Payer: COMMERCIAL

## 2019-04-24 PROCEDURE — 97140 MANUAL THERAPY 1/> REGIONS: CPT

## 2019-04-24 PROCEDURE — 97110 THERAPEUTIC EXERCISES: CPT

## 2019-04-24 NOTE — DISCHARGE SUMMARY
Outpatient Physical Therapy  [] [facility]    Phone: [phone]   Fax: [phone]   [x] [facility]  Phone: [phone]   Fax: [phone]  [] [facility]              Phone: [phone]   Fax: [phone]     Physical Therapy Progress/Discharge Note  Date: 2019        Patient Name:  Delfina Whitehead    :  1975  MRN: [id number]  Restrictions/Precautions:    · Diagnosis:  Right femoral fracture  · Treatment Diagnosis: Difficulty with walking, pain on right femur, Limited ROM on R LE, artis knee, weakness of right LE post op     Insurance/Certification information:  PT Insurance Information: [place]  Physician Information:  Referring Practitioner: Eugene rTevizo MD  Plan of care signed (Y/N):  Sent to Dr. Mathew Hennessy on 19     Visit# / total visits:   +10/10 (new script - one from Dr. Burma Denver and one from Dr. Mathew Hennessy)  Pain level:       0/10            G-Code (if applicable):      Date G-Code Applied:  19    PT G-Codes  Functional Assessment Tool Used: LEFSTOOL  Score: 62/CJ        Time Period for Report:  19-19  Cancels/No-shows to date:  0    Plan of Care/Treatment to date:  [x] Therapeutic Exercise    [x] Modalities:  [x] Therapeutic Activity     [] Ultrasound  [] Electrical Stimulation  [x] Gait Training      [] Cervical Traction    [] Lumbar Traction  [] Neuromuscular Re-education  [] Cold/hotpack [] Iontophoresis  [x] Instruction in HEP      Other:  [x] Manual Therapy       []    [] Aquatic Therapy       []                    ? Significant Findings At Last Visit/Comments:    Subjective:      Pt slipped and fell down flight of steps on 19 at the garage where he is a  in \[facility]". Kirasaulo Riddleodilon He was taken by ambulance to [facility] and had surgery that night for a femoral fracture. He began therapy the next day . He was on in-patient rehab unit from 19-19. He was brought by personal vehicle to [place] where his famlily lives.  He has seen a primary care MD here knee flex 84  · 03/06/19 knee flex with OP 90  · 3/11/19: right knee ROM : 4-95 with OP  · 3/29/19: right knee ROM =96/97  · 4/8/19: R knee flexion to 102 with OP  · 4/15/19: 0-108 with OP  · 4/24/19: ROM right knee:  0-108  ·               Strength:  Right quad, hamstring, and hip flexor:  4+/5 to 5-/5             Assessment:  Summary: Patient has improved well from the point where he arrived in dept in a wheelchair with extremely limited knee flexion to ambulating with a SPC to no asst device and 0-108 degrees of knee flexion. Knee flexion is not where it needs to be but patient will relocate to [facility]  and will continue with HEP and at [facility] prior to his departure. He will continue to increase strength. Patient's response to treatment:     Progress towards goals:    Short term goals  Time Frame for Short term goals: 6 weeks  Short term goal 1: Pt will increase right LE strength to 4/5/MET  Short term goal 2: Pt will increase ROM of right hip and knee by at least 10 degrees/ MET  Short term goal 3: Pt will progress ambulation to SPC/MET  Short term goal 4: Pt will show less edema around right distal LE /MET     Long term goals  Time Frame for Long term goals : Upon DC  Long term goal 1: Pt will increase right LE strength to 4+/5 or more/MET  Long term goal 2: Pt will increase ROM of right knee by at least 40-50 degrees/MET (65 deg  to 108 deg)  Long term goal 3: Pt will progress ambulation to no AD /MET     Patient goals : \"Get back to work and get back to doing all the things I like to do\"/ Partially MET         Current Frequency/Duration:  # Days per week: [] 1 day # Weeks: [] 1 week [] 4 weeks      [x] 2 days?    [] 2 weeks [] 5 weeks      [x] 3 days   [] 3 weeks [] 6 weeks **8 weeks  Rehab Potential: [] Excellent [] Good [] Fair  [] Poor     Goal Status:  [] Achieved [x] Partially Achieved  [] Not Achieved     Patient Status: [] Continue per initial plan of Care     [x] Patient now discharged     [] Additional visits requested, Please re-certify for additional visits:      Requested frequency/duration:  X/week for weeks    Electronically signed by:  Mal Gonzalez PT    If you have any questions or concerns, please don't hesitate to call.   Thank you for your referral.    Physician Signature:________________________________Date:__________________  By signing above, therapists plan is approved by physician

## 2019-04-24 NOTE — FLOWSHEET NOTE
Physical Therapy Daily Treatment Note  Date:  2019    Patient Name:  Katalina Coronel    :  1975  MRN: [id number]    Restrictions/Precautions:      Pertinent Medical History:      Medical/Treatment Diagnosis Information:  · Diagnosis: Right femoral fracture  · Treatment Diagnosis: Difficulty with walking, pain on right femur, Limited ROM on R LE, artis knee, weakness of right LE post op    Insurance/Certification information:  PT Insurance Information: [employer]  Physician Information:  Referring Practitioner: Mariah Gomez MD  Plan of care signed (Y/N):  Sent to Dr. Sommer Kelly on 19    Visit# / total visits:   +10/10 (new script - one from Dr. Douglas Martinez and one from Dr. Sommer Kelly)  Pain level:  0/10     G-Code (if applicable):      Date / Visit # G-Code Applied:  19  PT G-Codes  Functional Assessment Tool Used: LEFSTOOL  Score: 62/CJ    Progress Note: [x]  Yes  [x]  No  Next due by: Visit #10      History of Injury: See below     Subjective:  Subjective  Subjective: Pt slipped and fell down flight of steps on 19 at the garage where he is a  in [facility]. Casper Malone He was taken by ambulance to [facility] and had surgery that night for a femoral fracture. He began therapy the next day . He was on in-patient rehab unit from 19-19. He was brought by personal vehicle to [place] where his famlily lives. He has seen a primary care MD here and  will begin PT here. He will see a local ortho doctor here tomorrow. Capser Malone He is bothered by edema more than pain . He is walking with standard walker. 19: Patient reports hip and knee feel stiff. States he took pain pill before therapy. 19: Patient reports swelling is down some. States his anterior tigth area is tender. 19: Patient reports increased soreness in right adductor area. 3/8/19: Pt took Naproxyn and this helped the right adductor area. His ortho said he is healing well and to start weaning off walker to cane.   3/11/19: Pt reports no pain. He is working on more knee flexion. 3/13/19: No pain. I walked a lot lately. 3/15/19: Pt reports no pain right now . Took meds about one hour ago. Sleeping in bed now, not in recliner. 3/18/19: Pt reports no pain and states that he is tired from overdoing it this weekend. 03/20/19: Patient reports he has been using his cane more. 03/22/19: Patient reports he has minimal pain,just stiffness. 3/25/19: States he is really sore in his R thigh, may be due to the rain. 3/27/19: States he saw the MD and he is to come 4 more weeks. Pt is doing prettyg good  3/29/19: Pt reports that he is tight in the R thigh, but has no pain. 04/03/19: Patient reports leg was more swollen after he did a lot of walking over the weekend. 04/05/19: Patient reports leg has been feeling better. States he drove   4/8/19: Pt reports that he is doing better, but has some soreness on right thigh. 04/101/9: Patient reports soreness anterior thigh area. 4/15/19: Pt reports some soreness in anterior thigh. 4/17/19: Pt reports that he is improving and only has muscle soreness on right medial thigh. 04/22/19: Patient reports he has been walking more without the cane at home. States soreness and swelling is about the same.   4/24/19: \"The doctor said the bone is healing well and he is releasing me  from PT.\"  Objective: See eval  Observation:   Test measurements:  AROM R knee flex 84  03/06/19 knee flex with OP 90  3/11/19: right knee ROM : 4-95 with OP  3/29/19: right knee ROM =96/97  4/8/19: R knee flexion to 102 with OP  4/15/19: 0-108 with OP  4/24/19: ROM right knee:  0-108                Strength:  Right quad, hamstring, and hip flexor:  4+/5 to 5-/5      Exercises:  Exercise/Equipment Resistance/Repetitions Other comments   with CP 10 min C since E Z stretcwas in use    TKE 30 X 4#    Heel slides 10\"x 10    Ball rolls 20 X    Nu step  L6 x 6 min To work on knee flexion   Standing marching 30 x   alternating Added 3/01 Standing abd 20 x alternating Added 3/01   Minisquats 20 x without hands Added 3/01   Step flex strecth 3 x 30 sec  Added 3/04    HR  30 x  Added 3/04   Seated curl     Leg press 110# x 20x 2 Added 3/8/19   Sidestepping 2 min 3/11/19, 3/18/19 added orange band   Incline board 30 sec x 3 3/13   SLR 2 x 10 3# 3/18/19   Step balance 10 sec x 5 L/R      Other Therapeutic Activities:  Patient was educated on diagnosis, plan of care and prognosis of their complaint. Also, frequency and duration of treatments was discussed. Patient was informed of the attendance policy and issued a copy for their records. 2/26/19: gait with standard walker with CGA. Transfers are Independent. 3/8/18: Practiced with SPC within II bars and 3 point gait. 3/11/19: Practiced gait with cane in bars. Also practiced on steps and one step. 3/13/19: Pt ambulated through parallel bars several times with good gait sequence without hopping. 3/18/19: Pt was able to ambulate inside II bars with step thru, with cane and and then outside the bars for 40 feet with only cane and good gait sequence. Also ambulated 100 feet with SPC and min assist of one. 4/24/19: Reviewed LEFSTOOL, goals and took measurements. Home Exercise Program:  Patient was already familiar with the exercises instructed today, from PT at [facility]. 3/13/19: Gave step lunge written instructions. 4/24/19: Gave lime T band for HEP. Reviewed HEP. Manual Treatments:  STM to right quad Passive Knee flexion and Passive knee ext X 10 min    Modalities:    CP x 10 min to right thigh and knee. Timed Code Treatment Minutes:    60  Total Treatment Minutes:    70    Treatment/Activity Tolerance:  [x] Patient tolerated treatment well [] Patient limited by fatigue  [] Patient limited by pain  [] Patient limited by other medical complications  [x] Other: Pt met goal of ambulating with SPC today. Be aggressive with knee flexion.   Prognosis: [x] Good [] Fair  [] Poor    Goals:    Short term goals  Time Frame for Short term goals: 6 weeks  Short term goal 1: Pt will increase right LE strength to 4/5/MET  Short term goal 2: Pt will increase ROM of right hip and knee by at least 10 degrees/ MET  Short term goal 3: Pt will progress ambulation to SPC/MET  Short term goal 4: Pt will show less edema around right distal LE /MET     Long term goals  Time Frame for Long term goals : Upon DC  Long term goal 1: Pt will increase right LE strength to 4+/5 or more/MET  Long term goal 2: Pt will increase ROM of right knee by at least 40-50 degrees/MET (65 deg  to 108 deg)  Long term goal 3: Pt will progress ambulation to no AD /MET    Patient goals : \"Get back to work and get back to doing all the things I like to do\"/ Partially MET      Patient Requires Follow-up: [x] Yes  [] No    Plan:   [] Continue per plan of care [] Alter current plan (see comments)  [] Plan of care initiated [] Hold pending MD visit [x] Discharge    Plan for Next Session: DC from PT. Gave complimentary pass to [facility]. Continue with HEP.   Electronically signed by:  Annelise Luke, PT,

## 2024-05-24 ENCOUNTER — APPOINTMENT (RX ONLY)
Dept: URBAN - METROPOLITAN AREA CLINIC 53 | Facility: CLINIC | Age: 49
Setting detail: DERMATOLOGY
End: 2024-05-24

## 2024-05-24 DIAGNOSIS — L20.89 OTHER ATOPIC DERMATITIS: ICD-10-CM | Status: INADEQUATELY CONTROLLED

## 2024-05-24 DIAGNOSIS — L50.8 OTHER URTICARIA: ICD-10-CM | Status: INADEQUATELY CONTROLLED

## 2024-05-24 PROCEDURE — ? PRESCRIPTION

## 2024-05-24 PROCEDURE — 99204 OFFICE O/P NEW MOD 45 MIN: CPT

## 2024-05-24 PROCEDURE — ? MEDICATION COUNSELING

## 2024-05-24 PROCEDURE — ? COUNSELING

## 2024-05-24 PROCEDURE — ? PRESCRIPTION MEDICATION MANAGEMENT

## 2024-05-24 RX ORDER — HYDROXYZINE HYDROCHLORIDE 10 MG/1
TABLET, FILM COATED ORAL QHS
Qty: 60 | Refills: 1 | Status: ERX | COMMUNITY
Start: 2024-05-24

## 2024-05-24 RX ORDER — TRIAMCINOLONE ACETONIDE 1 MG/G
OINTMENT TOPICAL BID
Qty: 30 | Refills: 0 | Status: ERX | COMMUNITY
Start: 2024-05-24

## 2024-05-24 RX ADMIN — TRIAMCINOLONE ACETONIDE: 1 OINTMENT TOPICAL at 00:00

## 2024-05-24 RX ADMIN — HYDROXYZINE HYDROCHLORIDE: 10 TABLET, FILM COATED ORAL at 00:00

## 2024-05-24 ASSESSMENT — LOCATION SIMPLE DESCRIPTION DERM
LOCATION SIMPLE: NECK
LOCATION SIMPLE: LEFT FOREARM
LOCATION SIMPLE: RIGHT FOREARM
LOCATION SIMPLE: RIGHT PRETIBIAL REGION
LOCATION SIMPLE: LEFT PRETIBIAL REGION

## 2024-05-24 ASSESSMENT — LOCATION DETAILED DESCRIPTION DERM
LOCATION DETAILED: LEFT PROXIMAL PRETIBIAL REGION
LOCATION DETAILED: RIGHT CENTRAL LATERAL NECK
LOCATION DETAILED: LEFT VENTRAL PROXIMAL FOREARM
LOCATION DETAILED: RIGHT PROXIMAL PRETIBIAL REGION
LOCATION DETAILED: RIGHT VENTRAL PROXIMAL FOREARM

## 2024-05-24 ASSESSMENT — LOCATION ZONE DERM
LOCATION ZONE: LEG
LOCATION ZONE: NECK
LOCATION ZONE: ARM